# Patient Record
Sex: MALE | Race: ASIAN | NOT HISPANIC OR LATINO | URBAN - METROPOLITAN AREA
[De-identification: names, ages, dates, MRNs, and addresses within clinical notes are randomized per-mention and may not be internally consistent; named-entity substitution may affect disease eponyms.]

---

## 2021-12-30 ENCOUNTER — EMERGENCY (EMERGENCY)
Facility: HOSPITAL | Age: 41
LOS: 1 days | Discharge: HOME | End: 2021-12-30
Admitting: EMERGENCY MEDICINE
Payer: COMMERCIAL

## 2021-12-30 VITALS
TEMPERATURE: 97 F | WEIGHT: 149.91 LBS | OXYGEN SATURATION: 99 % | HEART RATE: 96 BPM | DIASTOLIC BLOOD PRESSURE: 66 MMHG | RESPIRATION RATE: 18 BRPM | HEIGHT: 66.14 IN | SYSTOLIC BLOOD PRESSURE: 118 MMHG

## 2021-12-30 DIAGNOSIS — U07.1 COVID-19: ICD-10-CM

## 2021-12-30 DIAGNOSIS — R05.9 COUGH, UNSPECIFIED: ICD-10-CM

## 2021-12-30 PROCEDURE — 99284 EMERGENCY DEPT VISIT MOD MDM: CPT

## 2021-12-30 RX ORDER — KETOROLAC TROMETHAMINE 30 MG/ML
15 SYRINGE (ML) INJECTION ONCE
Refills: 0 | Status: DISCONTINUED | OUTPATIENT
Start: 2021-12-30 | End: 2021-12-30

## 2021-12-30 RX ADMIN — Medication 15 MILLIGRAM(S): at 20:02

## 2021-12-30 NOTE — ED PROVIDER NOTE - ENMT, MLM
Airway patent, normocephalic, atraumatic. Nasal mucosa clear. Mouth with normal mucosa. Throat has no vesicles, no oropharyngeal exudates and uvula is midline. No trismus or vocal changes.

## 2021-12-30 NOTE — ED ADULT TRIAGE NOTE - CHIEF COMPLAINT QUOTE
BIBA c/o generalized malaise and cough. took at home COVID test, was positive. pt reports living in [place], has traveled to [facility] since 12/19.

## 2021-12-30 NOTE — ED PROVIDER NOTE - CLINICAL SUMMARY MEDICAL DECISION MAKING FREE TEXT BOX
COVID-19 + patient presents with sore throat, cough, body aches and shortness of breath for 2 days. No evidence of bacterial pneumonia or other emergent findings. Will give Toradol for pain relief and d/c home with isolation instructions. COVID-19 + patient presents with sore throat, cough, body aches and shortness of breath for 2 days. No evidence of bacterial pneumonia or other emergent findings. No hypoxia. No evidence of strep/PTA. Will give Toradol for pain relief and d/c home with isolation instructions.

## 2021-12-30 NOTE — ED ADULT NURSE NOTE - CHIEF COMPLAINT QUOTE
BIBA c/o generalized malaise and cough. took at home COVID test, was positive. pt reports living in [place], has traveled to [facility] since 12/19.

## 2021-12-30 NOTE — ED PROVIDER NOTE - PATIENT PORTAL LINK FT
You can access the FollowMyHealth Patient Portal offered by [facility] by registering at the following website: [email or web address]. By joining [facility]’s FollowMyHealth portal, you will also be able to view your health information using other applications (apps) compatible with our system.

## 2021-12-30 NOTE — ED ADULT NURSE NOTE - OBJECTIVE STATEMENT
Patient reports cough and body aches. Patient reports recent travel and reports positive  COVID test.

## 2021-12-30 NOTE — ED PROVIDER NOTE - OBJECTIVE STATEMENT
42 y/o male with no PMHx presents with 2 days of sore throat, body aches, cough and shortness of breath with coughing. Patient tested + for COVID-19 with at-home rapid test. Denies chest pain, leg swelling, nausea or vomiting.

## 2021-12-30 NOTE — ED PROVIDER NOTE - NSFOLLOWUPINSTRUCTIONS_ED_ALL_ED_FT
See covid instructions for isolation guidelines.  You should take ibuprofen 600-800 every 6 hours for sore throat, body aches, or fever.  You can also take tylenol in addition 500-1000 every 6 hours. These medications are safe to take together. They are both available over the counter.  Return to ED for worsening shortness of breath, chest pain, difficulty breathing.